# Patient Record
Sex: FEMALE | Race: WHITE | HISPANIC OR LATINO | URBAN - METROPOLITAN AREA
[De-identification: names, ages, dates, MRNs, and addresses within clinical notes are randomized per-mention and may not be internally consistent; named-entity substitution may affect disease eponyms.]

---

## 2024-09-13 ENCOUNTER — APPOINTMENT (RX ONLY)
Dept: URBAN - METROPOLITAN AREA CLINIC 129 | Facility: CLINIC | Age: 29
Setting detail: DERMATOLOGY
End: 2024-09-13

## 2024-09-13 DIAGNOSIS — L65.8 OTHER SPECIFIED NONSCARRING HAIR LOSS: ICD-10-CM

## 2024-09-13 DIAGNOSIS — L73.2 HIDRADENITIS SUPPURATIVA: ICD-10-CM

## 2024-09-13 DIAGNOSIS — L41.1 PITYRIASIS LICHENOIDES CHRONICA: ICD-10-CM | Status: WELL CONTROLLED

## 2024-09-13 DIAGNOSIS — L21.8 OTHER SEBORRHEIC DERMATITIS: ICD-10-CM

## 2024-09-13 PROCEDURE — ? ADDITIONAL NOTES

## 2024-09-13 PROCEDURE — ? PRESCRIPTION

## 2024-09-13 PROCEDURE — 99204 OFFICE O/P NEW MOD 45 MIN: CPT

## 2024-09-13 PROCEDURE — ? COUNSELING

## 2024-09-13 RX ORDER — FINASTERIDE 5 MG/1
TABLET, FILM COATED ORAL
Qty: 90 | Refills: 3 | Status: ERX | COMMUNITY
Start: 2024-09-13

## 2024-09-13 RX ORDER — MINOXIDIL 2.5 MG/1
TABLET ORAL
Qty: 30 | Refills: 3 | Status: ERX | COMMUNITY
Start: 2024-09-13

## 2024-09-13 RX ORDER — TRIAMCINOLONE ACETONIDE 1 MG/G
OINTMENT TOPICAL
Qty: 80 | Refills: 3 | Status: ERX | COMMUNITY
Start: 2024-09-13

## 2024-09-13 RX ORDER — DOXYCYCLINE HYCLATE 100 MG/1
CAPSULE, GELATIN COATED ORAL
Qty: 90 | Refills: 3 | Status: ERX | COMMUNITY
Start: 2024-09-13

## 2024-09-13 RX ORDER — SPIRONOLACTONE 100 MG/1
TABLET, FILM COATED ORAL
Qty: 90 | Refills: 3 | Status: ERX | COMMUNITY
Start: 2024-09-13

## 2024-09-13 RX ADMIN — MINOXIDIL: 2.5 TABLET ORAL at 00:00

## 2024-09-13 RX ADMIN — TRIAMCINOLONE ACETONIDE: 1 OINTMENT TOPICAL at 00:00

## 2024-09-13 RX ADMIN — DOXYCYCLINE HYCLATE: 100 CAPSULE, GELATIN COATED ORAL at 00:00

## 2024-09-13 RX ADMIN — SPIRONOLACTONE: 100 TABLET, FILM COATED ORAL at 00:00

## 2024-09-13 RX ADMIN — FINASTERIDE: 5 TABLET, FILM COATED ORAL at 00:00

## 2024-09-13 ASSESSMENT — LOCATION ZONE DERM
LOCATION ZONE: EAR
LOCATION ZONE: AXILLAE
LOCATION ZONE: SCALP

## 2024-09-13 ASSESSMENT — LOCATION SIMPLE DESCRIPTION DERM
LOCATION SIMPLE: RIGHT AXILLARY VAULT
LOCATION SIMPLE: LEFT EAR
LOCATION SIMPLE: LEFT SCALP

## 2024-09-13 ASSESSMENT — LOCATION DETAILED DESCRIPTION DERM
LOCATION DETAILED: LEFT CAVUM CONCHA
LOCATION DETAILED: RIGHT AXILLARY VAULT
LOCATION DETAILED: LEFT MEDIAL FRONTAL SCALP

## 2024-09-13 NOTE — PROCEDURE: ADDITIONAL NOTES
Detail Level: Simple
Additional Notes: Recommend zinc soap Monday,Wednesday,Friday.
Render Risk Assessment In Note?: no
Additional Notes: Recommend applying moisturizing cream.

## 2024-12-12 ENCOUNTER — APPOINTMENT (OUTPATIENT)
Dept: URBAN - METROPOLITAN AREA CLINIC 129 | Facility: CLINIC | Age: 29
Setting detail: DERMATOLOGY
End: 2024-12-12

## 2024-12-12 ENCOUNTER — RX ONLY (RX ONLY)
Age: 29
End: 2024-12-12

## 2024-12-12 DIAGNOSIS — L21.8 OTHER SEBORRHEIC DERMATITIS: ICD-10-CM | Status: IMPROVED

## 2024-12-12 DIAGNOSIS — L65.8 OTHER SPECIFIED NONSCARRING HAIR LOSS: ICD-10-CM

## 2024-12-12 DIAGNOSIS — L30.9 DERMATITIS, UNSPECIFIED: ICD-10-CM

## 2024-12-12 PROCEDURE — ? PRESCRIPTION MEDICATION MANAGEMENT

## 2024-12-12 PROCEDURE — ? COUNSELING

## 2024-12-12 PROCEDURE — ? TREATMENT REGIMEN

## 2024-12-12 PROCEDURE — 99214 OFFICE O/P EST MOD 30 MIN: CPT

## 2024-12-12 RX ORDER — MINOXIDIL 2.5 MG/1
TABLET ORAL
Qty: 30 | Refills: 3 | Status: ERX

## 2024-12-12 RX ORDER — FINASTERIDE 5 MG/1
TABLET, FILM COATED ORAL
Qty: 90 | Refills: 3 | Status: ERX

## 2024-12-12 RX ORDER — SPIRONOLACTONE 100 MG/1
TABLET, FILM COATED ORAL
Qty: 90 | Refills: 3 | Status: ERX

## 2024-12-12 ASSESSMENT — LOCATION ZONE DERM
LOCATION ZONE: EAR
LOCATION ZONE: TRUNK
LOCATION ZONE: SCALP

## 2024-12-12 ASSESSMENT — LOCATION DETAILED DESCRIPTION DERM
LOCATION DETAILED: RIGHT MEDIAL UPPER BACK
LOCATION DETAILED: LEFT CAVUM CONCHA
LOCATION DETAILED: LEFT MEDIAL FRONTAL SCALP

## 2024-12-12 ASSESSMENT — LOCATION SIMPLE DESCRIPTION DERM
LOCATION SIMPLE: LEFT SCALP
LOCATION SIMPLE: LEFT EAR
LOCATION SIMPLE: RIGHT UPPER BACK

## 2024-12-12 NOTE — PROCEDURE: TREATMENT REGIMEN
Continue Regimen: minoxidil 2.5 mg tablet take 1/2 a tablet at night but if patient experiences dizziness then reduce 1/4 at bedtime \\nfinasteride 5 mg tablet 
Detail Level: Zone
Initiate Treatment: triamcinolone acetonide 0.1 % topical ointment and moisturizing cream